# Patient Record
Sex: MALE
[De-identification: names, ages, dates, MRNs, and addresses within clinical notes are randomized per-mention and may not be internally consistent; named-entity substitution may affect disease eponyms.]

---

## 2019-06-20 ENCOUNTER — RESULT REVIEW (OUTPATIENT)
Age: 54
End: 2019-06-20

## 2020-11-28 ENCOUNTER — TRANSCRIPTION ENCOUNTER (OUTPATIENT)
Age: 55
End: 2020-11-28

## 2022-07-05 PROBLEM — Z00.00 ENCOUNTER FOR PREVENTIVE HEALTH EXAMINATION: Status: ACTIVE | Noted: 2022-07-05

## 2022-08-08 ENCOUNTER — APPOINTMENT (OUTPATIENT)
Dept: ORTHOPEDIC SURGERY | Facility: CLINIC | Age: 57
End: 2022-08-08

## 2022-08-08 PROCEDURE — 99213 OFFICE O/P EST LOW 20 MIN: CPT

## 2022-08-08 RX ORDER — FLUTICASONE FUROATE AND VILANTEROL TRIFENATATE 100; 25 UG/1; UG/1
100-25 POWDER RESPIRATORY (INHALATION)
Qty: 60 | Refills: 0 | Status: DISCONTINUED | COMMUNITY
Start: 2022-04-27

## 2022-08-08 RX ORDER — NABUMETONE 750 MG/1
750 TABLET, FILM COATED ORAL
Qty: 60 | Refills: 0 | Status: DISCONTINUED | COMMUNITY
Start: 2022-02-15

## 2022-08-08 RX ORDER — PREDNISONE 20 MG/1
20 TABLET ORAL
Qty: 8 | Refills: 0 | Status: DISCONTINUED | COMMUNITY
Start: 2022-05-02

## 2022-08-08 RX ORDER — DOXYCYCLINE HYCLATE 100 MG/1
100 CAPSULE ORAL
Qty: 14 | Refills: 0 | Status: DISCONTINUED | COMMUNITY
Start: 2022-04-27

## 2022-08-08 RX ORDER — OXYCODONE AND ACETAMINOPHEN 5; 325 MG/1; MG/1
5-325 TABLET ORAL
Qty: 20 | Refills: 0 | Status: DISCONTINUED | COMMUNITY
Start: 2022-05-04

## 2022-08-08 NOTE — HISTORY OF PRESENT ILLNESS
[de-identified] : The patient is a 57-year-old male here for subsequent re-evaluation of his left shoulder. He is\par status post left shoulder arthroscopy for rotator cuff repair, medial and lateral row fixation, subacromial decompression,\par extensive debridement of the glenohumeral joint of three separate structures and open biceps tenodesis on 05/05/2022.\par He is almost a little over 3 months out from his surgery. He is still having some pain in the shoulder. He is doing therapy at [facility] [address].

## 2022-08-08 NOTE — PHYSICAL EXAM
[Left] : left shoulder [] : motor and sensory intact distally [de-identified] :   He has weakness with rotator cuff resistance testing. [TWNoteComboBox7] : active forward flexion 165 degrees [TWNoteComboBox4] : passive forward flexion 180 degrees [de-identified] : active abduction 95 degrees [TWNoteComboBox6] : internal rotation L4

## 2022-08-08 NOTE — DISCUSSION/SUMMARY
[de-identified] :   Today I recommend he continue formal therapy, Rx provided for that.  I will see him back in 2 months for further evaluation.\par \par Supervising physician:

## 2022-09-06 ENCOUNTER — APPOINTMENT (OUTPATIENT)
Dept: NEUROLOGY | Facility: CLINIC | Age: 57
End: 2022-09-06

## 2022-09-06 VITALS
HEIGHT: 66 IN | WEIGHT: 187 LBS | BODY MASS INDEX: 30.05 KG/M2 | DIASTOLIC BLOOD PRESSURE: 83 MMHG | HEART RATE: 89 BPM | SYSTOLIC BLOOD PRESSURE: 110 MMHG

## 2022-09-06 PROCEDURE — 99213 OFFICE O/P EST LOW 20 MIN: CPT

## 2022-09-06 NOTE — REVIEW OF SYSTEMS
[Leg Weakness] : leg weakness [Numbness] : numbness [Tingling] : tingling [Abnormal Sensation] : an abnormal sensation [Negative] : Respiratory

## 2022-09-06 NOTE — HISTORY OF PRESENT ILLNESS
[FreeTextEntry1] : TODAY:  I had the pleasure of seeing Mr. Smalls today in follow up.  His previous history and physical findings have been reviewed.\par \par He is under our care for  RLS as well as headaches which he is receiving continuing active treatment for.  He states his headaches are no longer an issue as he is managed on a regimen of Emgality 120 mg/ml monthly which prevents his headaches.  When they do occur he will use sumatriptan which alleviates them completely.  He experiences no adverse side effects and we will continue as is. With respect to his RLS we had tried changing him to Horizant to address his symptoms but was not covered by his insurance.  He did not want to trial gabapentin and was subsequently switched back to mirapex 4mg po qhs which helps.  He states some nights he feels he can use an extra pill and I advised that typically the maximum dosage is 4.5 mg and we will discuss trialing that at this time.

## 2022-09-06 NOTE — ASSESSMENT
[FreeTextEntry1] : 57 year old male with headaches and RLS.  We will continue emgality and sumitriptan as mentioned and increase his mirapex to 4.5 mg po qhs.  He will f/u in 6 months for re evaluation and if there is any issue he will contact the office.\par \par I personally reviewed with the PA, this patient's history and physical exam findings, as documented above. I have discussed the relevant areas of concern, having direct implications to the presenting problems and illnesses, and I have personally examined all pertinent and positive and negative findings, which impact on the prior neurological treatment. \par \par \par Emily Sol, MS, PA-C\par Tod Jordan DO\par

## 2022-10-12 ENCOUNTER — APPOINTMENT (OUTPATIENT)
Dept: ORTHOPEDIC SURGERY | Facility: CLINIC | Age: 57
End: 2022-10-12

## 2022-10-19 ENCOUNTER — APPOINTMENT (OUTPATIENT)
Dept: PAIN MANAGEMENT | Facility: CLINIC | Age: 57
End: 2022-10-19

## 2022-10-19 VITALS
WEIGHT: 185 LBS | SYSTOLIC BLOOD PRESSURE: 133 MMHG | BODY MASS INDEX: 29.73 KG/M2 | DIASTOLIC BLOOD PRESSURE: 90 MMHG | HEART RATE: 80 BPM | HEIGHT: 66 IN

## 2022-10-19 DIAGNOSIS — M54.2 CERVICALGIA: ICD-10-CM

## 2022-10-19 DIAGNOSIS — Z98.1 ARTHRODESIS STATUS: ICD-10-CM

## 2022-10-19 PROCEDURE — 99214 OFFICE O/P EST MOD 30 MIN: CPT

## 2022-10-19 NOTE — DISCUSSION/SUMMARY
[de-identified] : 57-year-old male with chronic neck pain s/p fusion in the past. He has pain traveling down his right arm into his elbow. He has trialed various conservative treatments in the past with no relief. He agrees to trial acupuncture if his insurance covers it. He is not interested in a SCS trial. He defers a UDS and therefore I will not be prescribing him any pain medications at this time. He will follow up at our office as needed. \par \par Madelyn Burger PA-C\par Boubacar Saez MD\par

## 2022-10-19 NOTE — DATA REVIEWED
[FreeTextEntry1] : Imaging Study Review: MRI of the cervical spine 10/22/2021: Multilevel degenerative change, particularly C3-C4, C7-T1 levels. Prior anterior C4-C7 fusion. Grade 1 C7-T1 spondylolisthesis. Recommend passive flexion and extension cervical radiographs. Left greater than right C7-T1 facet hypertrophy. Left greater than right C7-T1 neural foraminal stenosis.\par \par X-ray of the cervical spine 11/03/2021: C4-5 and C5-6 interbody fusion with allografts in place and anterior plate with vertebral body screws at C4, C5 and C6. C6-C7 bony fusion. Minimal change of the upper cervical spine between neutral, flexion and extension views with no evidence of instability.

## 2022-10-19 NOTE — PHYSICAL EXAM
[Normal Coordination] : normal coordination [Normal Sensation] : normal sensation [Able to Communicate] : able to communicate [Well Developed] : well developed [Well Nourished] : well nourished [] : diminished ROM in all planes [Flexion] : flexion [Extension] : extension [Rotation to left] : rotation to left [Rotation to right] : rotation to right

## 2022-10-19 NOTE — HISTORY OF PRESENT ILLNESS
[FreeTextEntry1] : HPI: ORIGINAL PRESENTATION: He is a 56-year-old gentleman who presents today for evaluation of neck pain with associated spasms and radiculopathy into the right arm along with persistent lower back pain. He does have a past medical history of a C5-C7 fusion, which was followed by removal of the C5-C7 plate and ACDF at C4-C5 with plating from C4-C6. His surgeries were performed by Dr. Meier. Unfortunately, despite surgical treatment, he has had persistent pain nevertheless.\par \par TODAY: He presents for a revisit encounter. Last seen in 2021. He reports he underwent shoulder surgery, which made his neck pain worse. He is having difficulty with ROM. He has pain traveling down to his elbow. He underwent 2 GEOVANI's in December 2021 with no relief. He continues with nabumetone and a muscle relaxer. He has trialed PT in the past with no relief. He is not interested in trialing chiropractic treatments or a SCS trial. We discussed the option of acupuncture, which he agrees to look into and see if his insurance will cover it. I also discussed a pain medication for as needed purposes; however, the patient does not want to do a urine screen due to the UDS being to costly for him.

## 2022-10-20 ENCOUNTER — NON-APPOINTMENT (OUTPATIENT)
Age: 57
End: 2022-10-20

## 2022-11-09 ENCOUNTER — RX RENEWAL (OUTPATIENT)
Age: 57
End: 2022-11-09

## 2022-11-10 RX ORDER — PRAMIPEXOLE DIHYDROCHLORIDE 1 MG/1
1 TABLET ORAL
Qty: 180 | Refills: 0 | Status: ACTIVE | COMMUNITY
Start: 2022-07-07 | End: 1900-01-01

## 2023-01-05 ENCOUNTER — APPOINTMENT (OUTPATIENT)
Dept: NEUROLOGY | Facility: CLINIC | Age: 58
End: 2023-01-05
Payer: COMMERCIAL

## 2023-01-05 VITALS
SYSTOLIC BLOOD PRESSURE: 117 MMHG | WEIGHT: 185 LBS | BODY MASS INDEX: 29.73 KG/M2 | HEART RATE: 90 BPM | HEIGHT: 66 IN | DIASTOLIC BLOOD PRESSURE: 86 MMHG

## 2023-01-05 PROCEDURE — 99214 OFFICE O/P EST MOD 30 MIN: CPT

## 2023-01-05 RX ORDER — PREDNISONE 20 MG/1
20 TABLET ORAL DAILY
Qty: 8 | Refills: 0 | Status: ACTIVE | COMMUNITY
Start: 2023-01-05 | End: 1900-01-01

## 2023-01-05 NOTE — REVIEW OF SYSTEMS
[Leg Weakness] : leg weakness [Numbness] : numbness [Tingling] : tingling Home [Abnormal Sensation] : an abnormal sensation [Negative] : Respiratory

## 2023-01-05 NOTE — ASSESSMENT
[FreeTextEntry1] :   Cluster headache- will give him 4 days of prednisone to break the cycle, and use sumatriptan as abortive therapy.  Since he was able to be headache free for 2 years, will hoping that after the steroid use, he would not need a preventive agent.\par \par   Restless leg syndrome-continue with current regiment\par  put\par Melissa LAROSE attest that this document has been prepared under the direction and in the presence of Provider Adal Jordan DO\vance \par Thank You for letting me assist in the management of this patient.\par \par Tod Jordan DO \vance Board Certified, Neurology \par

## 2023-01-05 NOTE — HISTORY OF PRESENT ILLNESS
[FreeTextEntry1] : Mr. MONCHO REYNOLDS returns to the office for follow-up and his prior history and physical have been reviewed and he reports no change since last visit.  he has been seeing us for  restless leg syndrome  as well as cluster headaches.    his restless leg syndrome has been under control since his pramipexole  was increased to 4.5 mg.  His main reason for today's visit was that after 2 years of cluster headache free, it came back recently.  He used to use Emgality  which he did not think that the work that well, but now the insurance   started to charge him over 100 dollars for each pen, he could not afford to continue.  In the past sumatriptan injection head always worked well as abortive therapy.\par

## 2023-01-05 NOTE — PHYSICAL EXAM
[General Appearance - Alert] : alert [General Appearance - In No Acute Distress] : in no acute distress [Oriented To Time, Place, And Person] : oriented to person, place, and time [Affect] : the affect was normal [Mood] : the mood was normal [Memory Recent] : recent memory was not impaired [Memory Remote] : remote memory was not impaired [Person] : oriented to person [Place] : oriented to place [Time] : oriented to time [Short Term Intact] : short term memory intact [Remote Intact] : remote memory intact [Registration Intact] : recent registration memory intact [Cranial Nerves Optic (II)] : visual acuity intact bilaterally,  visual fields full to confrontation, pupils equal round and reactive to light [Cranial Nerves Oculomotor (III)] : extraocular motion intact [Cranial Nerves Facial (VII)] : face symmetrical [Cranial Nerves Accessory (XI - Cranial And Spinal)] : head turning and shoulder shrug symmetric [Motor Strength] : muscle strength was normal in all four extremities [Motor Tone] : muscle tone was normal in all four extremities [Involuntary Movements] : no involuntary movements were seen [Concentration Intact] : normal concentrating ability [Visual Intact] : visual attention was ~T not ~L decreased [Naming Objects] : no difficulty naming common objects [Repeating Phrases] : no difficulty repeating a phrase [Writing A Sentence] : no difficulty writing a sentence [Fluency] : fluency intact [Comprehension] : comprehension intact [Reading] : reading intact [Past History] : adequate knowledge of personal past history [Cranial Nerves Trigeminal (V)] : facial sensation intact symmetrically [Cranial Nerves Vestibulocochlear (VIII)] : hearing was intact bilaterally [Cranial Nerves Glossopharyngeal (IX)] : tongue and palate midline [Cranial Nerves Hypoglossal (XII)] : there was no tongue deviation with protrusion [No Muscle Atrophy] : normal bulk in all four extremities [Sensation Tactile Decrease] : light touch was intact [Abnormal Walk] : normal gait [Balance] : balance was intact [Past-pointing] : there was no past-pointing [Tremor] : no tremor present [2+] : Ankle jerk left 2+ [Plantar Reflex Right Only] : normal on the right [Plantar Reflex Left Only] : normal on the left

## 2023-01-09 ENCOUNTER — APPOINTMENT (OUTPATIENT)
Dept: ORTHOPEDIC SURGERY | Facility: CLINIC | Age: 58
End: 2023-01-09
Payer: COMMERCIAL

## 2023-01-09 DIAGNOSIS — M75.22 BICIPITAL TENDINITIS, LEFT SHOULDER: ICD-10-CM

## 2023-01-09 PROCEDURE — 99213 OFFICE O/P EST LOW 20 MIN: CPT

## 2023-01-09 NOTE — PHYSICAL EXAM
[Left] : left shoulder [] : no erythema [FreeTextEntry8] :   Tenderness to palpation over the mid biceps [de-identified] :   He has weakness with rotator cuff resistance testing. [TWNoteComboBox7] : active forward flexion 100 degrees [TWNoteComboBox4] : passive forward flexion 180 degrees [de-identified] : active abduction 95 degrees [TWNoteComboBox6] : internal rotation L4

## 2023-01-09 NOTE — DISCUSSION/SUMMARY
[de-identified] :  At this point he has had formal physical therapy for this, as well as anti-inflammatory medication, and has had more than 6 weeks of physician directed treatment with this biceps pain, send authorization for MRI left humerus to evaluate the source of his biceps pain.  He will call me 2 days after the MRI is performed so we can discuss results, I will see him in 2 months for further evaluation.\par \par Supervising physician:  Dr. Lyon

## 2023-01-09 NOTE — HISTORY OF PRESENT ILLNESS
[de-identified] : The patient is a 57-year-old male here for a subsequent re-evaluation of his left shoulder. He is\par status post left shoulder arthroscopy for rotator cuff repair, medial and lateral row fixation, subacromial decompression,\par extensive debridement of the glenohumeral joint of three separate structures and open biceps tenodesis on 05/05/2022.\par He is almost a little over 8 months out from his surgery. He is still having some pain in his mid biceps.  He has been doing formal physical therapy at [facility], his last therapy session was 1 month ago..  He has tried nabumetone for the biceps.

## 2023-02-28 ENCOUNTER — NON-APPOINTMENT (OUTPATIENT)
Age: 58
End: 2023-02-28

## 2023-03-06 ENCOUNTER — APPOINTMENT (OUTPATIENT)
Dept: ORTHOPEDIC SURGERY | Facility: CLINIC | Age: 58
End: 2023-03-06

## 2023-03-14 ENCOUNTER — APPOINTMENT (OUTPATIENT)
Dept: ORTHOPEDIC SURGERY | Facility: CLINIC | Age: 58
End: 2023-03-14
Payer: COMMERCIAL

## 2023-03-14 DIAGNOSIS — M75.122 COMPLETE ROTATOR CUFF TEAR OR RUPTURE OF LEFT SHOULDER, NOT SPECIFIED AS TRAUMATIC: ICD-10-CM

## 2023-03-14 DIAGNOSIS — M75.02 ADHESIVE CAPSULITIS OF LEFT SHOULDER: ICD-10-CM

## 2023-03-14 PROCEDURE — 20611 DRAIN/INJ JOINT/BURSA W/US: CPT | Mod: LT

## 2023-03-14 PROCEDURE — 99213 OFFICE O/P EST LOW 20 MIN: CPT | Mod: 25

## 2023-03-14 RX ORDER — SUMATRIPTAN SUCCINATE 6 MG/.5ML
6 INJECTION SUBCUTANEOUS
Qty: 15 | Refills: 2 | Status: ACTIVE | COMMUNITY
Start: 2023-01-05 | End: 1900-01-01

## 2023-03-14 NOTE — PHYSICAL EXAM
[Left] : left shoulder [] : motor and sensory intact distally [FreeTextEntry8] : Tenderness to palpation over the mid biceps [TWNoteComboBox7] : active forward flexion 140 degrees [TWNoteComboBox4] : passive forward flexion 140 degrees [de-identified] : active abduction 90 degrees [TWNoteComboBox6] : internal rotation L5

## 2023-03-14 NOTE — DISCUSSION/SUMMARY
[de-identified] : In my opinion he has developed a frozen shoulder.  I recommend a cortisone injection for the left shoulder.  The patient agreed.  The left glenohumeral joint was injected with 2 cc lidocaine, 1 see dexamethasone.  Procedure note generated.  He will return to formal physical therapy for his left shoulder adhesive capsulitis.  I will see him in 2 months for further evaluation\par \par Supervising Physician: Dr. Lyon

## 2023-03-14 NOTE — HISTORY OF PRESENT ILLNESS
[de-identified] : The patient is a 57-year-old male here for a subsequent re-evaluation of his left shoulder. He is status post left shoulder arthroscopy for rotator cuff repair, medial and lateral row fixation, subacromial decompression, extensive debridement of the glenohumeral joint of three separate structures and open biceps tenodesis on 05/05/2022.  He is still having pain in the left shoulder.  His latest MRI of the left shoulder on 3/8/2023 did not show a retear of the rotator cuff.  Dr. Lyon and I reviewed these images.  He reports decreased range of motion of the shoulder.

## 2023-03-14 NOTE — PROCEDURE
[Large Joint Injection] : Large joint injection [Left] : of the left [Glenohumeral Joint] : glenohumeral joint [___ cc    1%] : Lidocaine ~Vcc of 1%  [___ cc    4mg] : Dexamethasone (Decadron) ~Vcc of 4 mg  [Risks, benefits, alternatives discussed / Verbal consent obtained] : the risks benefits, and alternatives have been discussed, and verbal consent was obtained [Glenohmeral injection] : glenohumeral injection [All ultrasound images have been permanently captured and stored accordingly in our picture archiving and communication system] : All ultrasound images have been permanently captured and stored accordingly in our picture archiving and communication system

## 2023-05-08 ENCOUNTER — RX RENEWAL (OUTPATIENT)
Age: 58
End: 2023-05-08

## 2023-07-28 ENCOUNTER — APPOINTMENT (OUTPATIENT)
Dept: ORTHOPEDIC SURGERY | Facility: CLINIC | Age: 58
End: 2023-07-28
Payer: COMMERCIAL

## 2023-07-28 VITALS — BODY MASS INDEX: 30.37 KG/M2 | HEIGHT: 66 IN | WEIGHT: 189 LBS

## 2023-07-28 DIAGNOSIS — S90.31XA CONTUSION OF RIGHT FOOT, INITIAL ENCOUNTER: ICD-10-CM

## 2023-07-28 DIAGNOSIS — S93.691A OTHER SPRAIN OF RIGHT FOOT, INITIAL ENCOUNTER: ICD-10-CM

## 2023-07-28 DIAGNOSIS — S90.121A CONTUSION OF RIGHT FOOT, INITIAL ENCOUNTER: ICD-10-CM

## 2023-07-28 PROCEDURE — 73630 X-RAY EXAM OF FOOT: CPT | Mod: RT

## 2023-07-28 PROCEDURE — 99213 OFFICE O/P EST LOW 20 MIN: CPT

## 2023-07-28 NOTE — PHYSICAL EXAM
[Right] : right foot and ankle [Mild] : mild swelling of toe(s) [1st] : 1st [2nd] : 2nd [3rd] : 3rd [4th] : 4th [4___] : [facility] 4[unfilled]/5 [2+] : posterior tibialis pulse: 2+ [] : antalgic [FreeTextEntry9] : Limited ROM secondary to swelling/pain

## 2023-07-28 NOTE — HISTORY OF PRESENT ILLNESS
[de-identified] : Patient is a 58-year-old male who reports to the office for evaluation of his right foot/toe pain since yesterday on 7/27/2023.  He attempted to kick a ball when he accidentally kicked a post in his yard instead.  Since the injury, admits to bruising and swelling on the dorsal aspect of his foot.  Walking, range of motion, palpating certain areas of the foot and toes aggravate the patient's pain.  Denies any numbness or tingling.

## 2023-07-28 NOTE — DISCUSSION/SUMMARY
[de-identified] : Patient's first and second and third and fourth toes were buddy taped together for support/stability.  He was placed in a short cam walker boot and may weight-bear as tolerated using a cane if needed.\par \par The patient was advised to rest/ice the area and may alternate with warm compresses as needed.  Gentle ROM exercises and Epson salt and warm water was encouraged.  Explained to the patient that this may take 4 to 6 weeks to fully heal and that the first 2 weeks are usually the worst.\par \par He will take nabumetone which she has at home as needed for pain.  Right foot MRI ordered for further evaluation.  Patient was advised to call the office a few days after getting the MRI done to discuss results over the phone.\par \par The patient will follow-up in 2-3 weeks for repeat x-rays and further evaluation.  All of the patient's questions/concerns were answered in detail.\par \par \par

## 2023-08-08 ENCOUNTER — APPOINTMENT (OUTPATIENT)
Dept: MRI IMAGING | Facility: CLINIC | Age: 58
End: 2023-08-08

## 2023-08-16 ENCOUNTER — APPOINTMENT (OUTPATIENT)
Dept: ORTHOPEDIC SURGERY | Facility: CLINIC | Age: 58
End: 2023-08-16
Payer: COMMERCIAL

## 2023-08-16 DIAGNOSIS — S92.911A UNSPECIFIED FRACTURE OF RIGHT TOE(S), INITIAL ENCOUNTER FOR CLOSED FRACTURE: ICD-10-CM

## 2023-08-16 PROCEDURE — 99203 OFFICE O/P NEW LOW 30 MIN: CPT

## 2023-08-16 PROCEDURE — 99213 OFFICE O/P EST LOW 20 MIN: CPT

## 2023-08-16 PROCEDURE — 73660 X-RAY EXAM OF TOE(S): CPT | Mod: RT

## 2023-08-16 NOTE — PHYSICAL EXAM
[de-identified] : He is swelling and tenderness palpation at the MTP joints of the second and third toes.  There is intact active and passive range of motion.  No significant instability noted.  He is neurovascular intact distally.

## 2023-08-16 NOTE — HISTORY OF PRESENT ILLNESS
[de-identified] : 58-year-old patient comes to see me in regards to an injury to his right foot which she sustained approximately 2 weeks ago.  He was seen at the urgent care where he was diagnosed with lesser toe fractures present at the proximal phalanx.  Currently his pain is well controlled.  Is been taping.  Minimal pain.

## 2023-08-16 NOTE — DISCUSSION/SUMMARY
[de-identified] : I reassured the patient that this will go on to heal uneventfully.  He should continue wearing a hard soled shoe.  Avoid impact activity for another month.  I will see him back as needed.  All questions answered.

## 2023-08-16 NOTE — DATA REVIEWED
[FreeTextEntry1] : 3 views of the patient's right foot ordered reviewed by me personally.  They demonstrate evidence of displaced avulsion fractures which are intra-articular involving the second and third proximal phalanges.

## 2023-10-03 ENCOUNTER — APPOINTMENT (OUTPATIENT)
Dept: PAIN MANAGEMENT | Facility: CLINIC | Age: 58
End: 2023-10-03

## 2023-10-27 ENCOUNTER — APPOINTMENT (OUTPATIENT)
Dept: PAIN MANAGEMENT | Facility: CLINIC | Age: 58
End: 2023-10-27
Payer: COMMERCIAL

## 2023-10-27 VITALS — BODY MASS INDEX: 28.93 KG/M2 | HEIGHT: 66 IN | WEIGHT: 180 LBS

## 2023-10-27 VITALS — WEIGHT: 180 LBS | HEIGHT: 66 IN | BODY MASS INDEX: 28.93 KG/M2

## 2023-10-27 DIAGNOSIS — M25.561 PAIN IN RIGHT KNEE: ICD-10-CM

## 2023-10-27 DIAGNOSIS — M25.562 PAIN IN RIGHT KNEE: ICD-10-CM

## 2023-10-27 DIAGNOSIS — Z87.39 PERSONAL HISTORY OF OTHER DISEASES OF THE MUSCULOSKELETAL SYSTEM AND CONNECTIVE TISSUE: ICD-10-CM

## 2023-10-27 DIAGNOSIS — J43.9 EMPHYSEMA, UNSPECIFIED: ICD-10-CM

## 2023-10-27 DIAGNOSIS — Z82.61 FAMILY HISTORY OF ARTHRITIS: ICD-10-CM

## 2023-10-27 DIAGNOSIS — G89.29 PAIN IN RIGHT KNEE: ICD-10-CM

## 2023-10-27 PROCEDURE — 99204 OFFICE O/P NEW MOD 45 MIN: CPT

## 2023-10-27 PROCEDURE — 99214 OFFICE O/P EST MOD 30 MIN: CPT

## 2023-10-27 RX ORDER — DULOXETINE HYDROCHLORIDE 60 MG/1
60 CAPSULE, DELAYED RELEASE PELLETS ORAL
Qty: 30 | Refills: 1 | Status: ACTIVE | COMMUNITY
Start: 2023-10-27 | End: 1900-01-01

## 2023-10-27 RX ORDER — NABUMETONE 750 MG/1
750 TABLET, FILM COATED ORAL
Qty: 30 | Refills: 0 | Status: ACTIVE | COMMUNITY
Start: 2023-10-27 | End: 1900-01-01

## 2023-12-04 ENCOUNTER — APPOINTMENT (OUTPATIENT)
Dept: PAIN MANAGEMENT | Facility: CLINIC | Age: 58
End: 2023-12-04
Payer: COMMERCIAL

## 2023-12-04 VITALS — WEIGHT: 180 LBS | HEIGHT: 66 IN | BODY MASS INDEX: 28.93 KG/M2

## 2023-12-04 DIAGNOSIS — M47.812 SPONDYLOSIS W/OUT MYELOPATHY OR RADICULOPATHY, CERVICAL REGION: ICD-10-CM

## 2023-12-04 DIAGNOSIS — M54.16 RADICULOPATHY, LUMBAR REGION: ICD-10-CM

## 2023-12-04 PROCEDURE — 99214 OFFICE O/P EST MOD 30 MIN: CPT

## 2023-12-04 RX ORDER — MELOXICAM 15 MG/1
15 TABLET ORAL DAILY
Qty: 30 | Refills: 0 | Status: ACTIVE | COMMUNITY
Start: 2023-12-04 | End: 1900-01-01

## 2023-12-04 RX ORDER — METHYLPREDNISOLONE 4 MG/1
4 TABLET ORAL
Qty: 1 | Refills: 0 | Status: ACTIVE | COMMUNITY
Start: 2023-12-04 | End: 1900-01-01

## 2024-01-02 ENCOUNTER — APPOINTMENT (OUTPATIENT)
Dept: PAIN MANAGEMENT | Facility: CLINIC | Age: 59
End: 2024-01-02
Payer: COMMERCIAL

## 2024-01-02 DIAGNOSIS — M79.18 MYALGIA, OTHER SITE: ICD-10-CM

## 2024-01-02 PROCEDURE — 99214 OFFICE O/P EST MOD 30 MIN: CPT | Mod: 25

## 2024-01-02 PROCEDURE — 20552 NJX 1/MLT TRIGGER POINT 1/2: CPT | Mod: RT

## 2024-01-02 NOTE — PHYSICAL EXAM
[de-identified] : Cervical Spine:  + Spurling's on the right side  Lumbar spine:  +facet loading bilaterally  TTP to right cervical paraspinals and traps

## 2024-01-02 NOTE — DATA REVIEWED
[FreeTextEntry1] : MRI of the cervical spine dated 12/05/23 suggests,    Post C4-7 ACDF. Degenerative change of the cervical spine as detailed in the body of the report, not significantly changed from prior MRI dated 10/22/21.    Mild anterolisthesis at C7-T1 with mild intraosseous and pericapsular edema associated with the bilateral C7-T1 facet joints, similar to prior.

## 2024-01-02 NOTE — PROCEDURE
[Trigger point 1-2 muscle groups] : trigger point 1-2 muscle groups [Right] : of the right [Cervical paraspinal muscle] : cervical paraspinal muscle [Trapezius muscle] : trapezius muscle [Pain] : pain [Alcohol] : alcohol [___ cc    0.25%] : Bupivacaine (Marcaine) ~Vcc of 0.25%  [___ cc    4mg] : Dexamethasone (Decadron) ~Vcc of 4 mg  [Call if redness, pain or fever occur] : call if redness, pain or fever occur [Apply ice for 15min out of every hour for the next 12-24 hours as tolerated] : apply ice for 15 minutes out of every hour for the next 12-24 hours as tolerated [Previous OTC use and PT nontherapeutic] : patient has tried OTC's including aspirin, Ibuprofen, Aleve, etc or prescription NSAIDS, and/or exercises at home and/or physical therapy without satisfactory response [Patient had decreased mobility in the joint] : patient had decreased mobility in the joint [Risks, benefits, alternatives discussed / Verbal consent obtained] : the risks benefits, and alternatives have been discussed, and verbal consent was obtained

## 2024-01-02 NOTE — HISTORY OF PRESENT ILLNESS
[FreeTextEntry1] : 12/4/23 Patient is present for a imaging review for the neck. At his last visit we ordered an x-ray and MRI of the lumbar spine but patient did not have it done yet. Regarding his neck pain, he described his pain as a stabbing, pulling sensation located on the right side of the neck with tingling down to the right elbow. Cervical extension and lateral rotation improve the pain, cervical flexion worsens the pain. Pt has tried NSAIDs, 6 weeks of PT, HEP in the past with minimal relief. Patient has tried injection therapy in the past with Dr. Saez with minimal relief. Today he rates his pain a 8/10 on the pain scale.   Regarding low back pain, Thomas continues to be throbbing, dull/achy in nature. Pain is worse with standing and walking better with sitting. Patient had tried NSAIDs, PT, and Chiropractic therapy in the past with minimal to no relief. Patient rates his pain a 7/10 on the pain scale. Patient denies bowel/bladder incontinence, weakness, falls.  TODAY REVISIT ENCOUNTER: 01/02/24.  Patient presents for a follow up visit today with continued neck pain, he describes the pain as sharp, stabbing and a pulling sensation on the right side of the neck associated with spasms and tingling down to his right elbow. He rates the pain a 9  out of 10 on the pain scale today. He notes he has trialed injection therapy in the past with Dr. Saez with minimal relief, however he notes he has yet to trial cervical trigger points. Patient denies any bowel or bladder dysfunction, incontinence, or saddle anesthesia.

## 2024-01-02 NOTE — DISCUSSION/SUMMARY
[de-identified] : A discussion regarding available pain management treatment options occurred with the patient. These included interventional, rehabilitative, pharmacological, and alternative modalities. We will proceed with the following:   Interventional treatment options:  -In office today - Cervical paraspinals and traps right sided trigger points.  Patient has documented myofascial spasms and trigger points in the muscle. Patient has trialed rehab (Home exercise, physical therapy or chiropractic care) and medications. We will schedule a series of 1-3 trigger points over 3-6 weeks. If 50% or greater relief for 6-8 weeks another can be repeated vs Botox.  Risks with the procedure such as bleeding and infection was discussed in detail.  -Ordered a C7-T1 GEOVANI without MAC.  Treatment options were discussed with the patient. The patient has been having persistent neck pain with radiculopathy with minimal improvement with conservative therapies. The patient was given the option to proceed with a cervical epidural steroid injection to try to get some pain relief.  If we are unable to get pain relief with this procedure we will reassess our options before proceeding.    The risks and benefits were discussed which included bleeding, infection, nerve injury, no pain relief or worse, increased pain. All questions were answered, and the patient will schedule for the injection on the next available date.    Medication based treatment options:  -4mg Medrol dose pack was prescribed for 6 days. If pain continues to persist in 1 week begin ordered Mobic 15mg daily for 2 weeks. Discussed risks and benefits. Avoid taking for any side effects  -Ordered Methocarbamol 750 MG TAKE 1 TABLET AT BEDTIME for 30 Days    Follow up 1-2 weeks post injection for assessment of efficacy and further recommendations.  I, Robert Hansen, attest that this documentation has been prepared under the direction and in the presence of Provider Tomi Campuzano, DO The documentation recorded by the scribe, in my presence, accurately reflects the service I personally performed, and the decisions made by me with my edits as appropriate.   Best Regards, Tomi Campuzano D.O.

## 2024-01-17 ENCOUNTER — APPOINTMENT (OUTPATIENT)
Dept: PAIN MANAGEMENT | Facility: CLINIC | Age: 59
End: 2024-01-17

## 2024-02-07 ENCOUNTER — APPOINTMENT (OUTPATIENT)
Dept: PAIN MANAGEMENT | Facility: CLINIC | Age: 59
End: 2024-02-07

## 2024-04-03 ENCOUNTER — APPOINTMENT (OUTPATIENT)
Dept: PAIN MANAGEMENT | Facility: CLINIC | Age: 59
End: 2024-04-03
Payer: COMMERCIAL

## 2024-04-03 PROCEDURE — 62321 NJX INTERLAMINAR CRV/THRC: CPT

## 2024-04-03 NOTE — PROCEDURE
[FreeTextEntry3] : Date of Service: 04/03/2024   Account: [id number]  Patient: MONCHO REYNOLDS   YOB: 1965  Age: 59 year  Surgeon:      Tomi Campuzano DO  Assistant:    None  Pre-Operative Diagnosis:         Cervical Radiculopathy (M54.12)  Post Operative Diagnosis:       Cervical Radiculopathy (M54.12)  Procedure:             Cervical (C7-T1) interlaminar epidural steroid injection under fluoroscopic guidance  Anesthesia:  none  This procedure was carried out using fluoroscopic guidance.  The risks and benefits of the procedure were discussed extensively with the patient.  The consent of the patient was obtained and the following procedure was performed. The patient was placed in the prone position on the fluoroscopy table and the area was prepped and draped in a sterile fashion.  A timeout was performed with all essential staff present and the site and side were verified.  The patient was placed in the prone position and optimized to patient comfort.  The cervical area was prepped and draped in a sterile fashion.  The fluoroscope visualized the C7-T1 interspace using slight cephalad-caudad angulation and this area was marked.  Using sterile technique the superficial skin was anesthetized with 1% Lidocaine.  A 20 gauge 3.5 inch Tuohy needle was advanced under fluoroscopy until ligament was engaged.  Using a contralateral oblique view, a "loss of resistance" to air technique was utilized in order to gain access to the epidural space.  After negative aspiration for heme and CSF, 1 cc of Omnipaque contrast was administered and the appropriate cervical epidurogram was obtained in the CORIN and A/P view..  A total injectate of 3 cc of preservative free normal saline and 10mg of Dexamethasone was then injected into the epidural space while maintaining meaningful verbal contact with the patient.    The needle was subsequently removed.  Vital signs remained normal.  Pulse oximeter was used throughout the procedure and the patient's pulse and oxygen saturation remained within normal limits.  The patient tolerated the procedure well.  There were no complications.  The patient was instructed to apply ice over the injection sites for twenty minutes every two hours for the next 24 to 48 hours.  Disposition:       1. The patient was advised to F/U in 1-2 weeks to assess the response to the injection.      2. The patient was also instructed to contact me immediately if there were any concerns related to the procedure performed.    Tomi Campuzano DO

## 2024-04-29 ENCOUNTER — APPOINTMENT (OUTPATIENT)
Dept: PAIN MANAGEMENT | Facility: CLINIC | Age: 59
End: 2024-04-29
Payer: COMMERCIAL

## 2024-04-29 VITALS — WEIGHT: 180 LBS | HEIGHT: 66 IN | BODY MASS INDEX: 28.93 KG/M2

## 2024-04-29 DIAGNOSIS — M54.12 RADICULOPATHY, CERVICAL REGION: ICD-10-CM

## 2024-04-29 DIAGNOSIS — M54.50 LOW BACK PAIN, UNSPECIFIED: ICD-10-CM

## 2024-04-29 PROCEDURE — 99214 OFFICE O/P EST MOD 30 MIN: CPT

## 2024-04-29 RX ORDER — METHOCARBAMOL 750 MG/1
750 TABLET, FILM COATED ORAL
Qty: 30 | Refills: 2 | Status: ACTIVE | COMMUNITY
Start: 2023-12-04 | End: 1900-01-01

## 2024-04-29 NOTE — HISTORY OF PRESENT ILLNESS
[FreeTextEntry1] : 12/4/23 Patient is present for a imaging review for the neck. At his last visit we ordered an x-ray and MRI of the lumbar spine but patient did not have it done yet. Regarding his neck pain, he described his pain as a stabbing, pulling sensation located on the right side of the neck with tingling down to the right elbow. Cervical extension and lateral rotation improve the pain, cervical flexion worsens the pain. Pt has tried NSAIDs, 6 weeks of PT, HEP in the past with minimal relief. Patient has tried injection therapy in the past with Dr. Saez with minimal relief. Today he rates his pain a 8/10 on the pain scale.   Regarding low back pain, Thomas continues to be throbbing, dull/achy in nature. Pain is worse with standing and walking better with sitting. Patient had tried NSAIDs, PT, and Chiropractic therapy in the past with minimal to no relief. Patient rates his pain a 7/10 on the pain scale. Patient denies bowel/bladder incontinence, weakness, falls.  TODAY 04-: Patient presents to the office today for a follow up visit, he is status post a cervical (C7-T1) interlaminar epidural steroid injection under fluoroscopic guidance on 04/03/2024 which provided him with about 50% sustained relief for a day.   Patient presents for a follow up visit today with continued neck pain, he describes the pain as sharp, stabbing and a pulling sensation on the right side of the neck associated with spasms and tingling down to his right elbow. He rates the pain a 9 out of 10 on the pain scale today. He notes he has trialed injection therapy in the past with Dr. Saez with minimal relief, however he notes he has yet to trial cervical trigger points. Patient denies any bowel or bladder dysfunction, incontinence, or saddle anesthesia.

## 2024-04-29 NOTE — DISCUSSION/SUMMARY
[de-identified] : A discussion regarding available pain management treatment options occurred with the patient. These included interventional, rehabilitative, pharmacological, and alternative modalities. We will proceed with the following:   Interventional treatment options:  1. Ordered a C7-T1 GEOVANI without MAC.  Treatment options were discussed with the patient. The patient has been having persistent neck pain with radiculopathy with minimal improvement with conservative therapies. The patient was given the option to proceed with a cervical epidural steroid injection to try to get some pain relief.  If we are unable to get pain relief with this procedure we will reassess our options before proceeding.    The risks and benefits were discussed which included bleeding, infection, nerve injury, no pain relief or worse, increased pain. All questions were answered, and the patient will schedule for the injection on the next available date.  Medication based treatment options:  -Ordered Methocarbamol 750 MG TAKE 1 TABLET AT BEDTIME for 30 Days    Follow up 1-2 weeks post injection for assessment of efficacy and further recommendations.  I, Robert Hansen, attest that this documentation has been prepared under the direction and in the presence of Provider Tomi Campuzano, DO The documentation recorded by the scribe, in my presence, accurately reflects the service I personally performed, and the decisions made by me with my edits as appropriate.   Best Regards, Tomi Campuzano D.O.

## 2024-04-29 NOTE — PHYSICAL EXAM
[de-identified] : Cervical Spine:  + Spurling's on the right side  Lumbar spine:  +facet loading bilaterally  TTP to right cervical paraspinals and traps

## 2024-05-15 ENCOUNTER — APPOINTMENT (OUTPATIENT)
Dept: PAIN MANAGEMENT | Facility: CLINIC | Age: 59
End: 2024-05-15

## 2024-06-04 ENCOUNTER — APPOINTMENT (OUTPATIENT)
Dept: PAIN MANAGEMENT | Facility: CLINIC | Age: 59
End: 2024-06-04

## 2024-06-27 ENCOUNTER — APPOINTMENT (OUTPATIENT)
Dept: NEUROLOGY | Facility: CLINIC | Age: 59
End: 2024-06-27

## 2024-07-01 ENCOUNTER — APPOINTMENT (OUTPATIENT)
Dept: NEUROLOGY | Facility: CLINIC | Age: 59
End: 2024-07-01
Payer: COMMERCIAL

## 2024-07-01 VITALS
HEIGHT: 66 IN | BODY MASS INDEX: 27.97 KG/M2 | DIASTOLIC BLOOD PRESSURE: 78 MMHG | HEART RATE: 86 BPM | WEIGHT: 174 LBS | SYSTOLIC BLOOD PRESSURE: 114 MMHG

## 2024-07-01 DIAGNOSIS — G25.81 RESTLESS LEGS SYNDROME: ICD-10-CM

## 2024-07-01 DIAGNOSIS — G44.009 CLUSTER HEADACHE SYNDROME, UNSPECIFIED, NOT INTRACTABLE: ICD-10-CM

## 2024-07-01 PROCEDURE — 99213 OFFICE O/P EST LOW 20 MIN: CPT

## 2024-09-18 ENCOUNTER — APPOINTMENT (OUTPATIENT)
Dept: ORTHOPEDIC SURGERY | Facility: CLINIC | Age: 59
End: 2024-09-18
Payer: COMMERCIAL

## 2024-09-18 DIAGNOSIS — M65.9 SYNOVITIS AND TENOSYNOVITIS, UNSPECIFIED: ICD-10-CM

## 2024-09-18 DIAGNOSIS — M24.159 OTHER ARTICULAR CARTILAGE DISORDERS, UNSPECIFIED HIP: ICD-10-CM

## 2024-09-18 DIAGNOSIS — M70.62 TROCHANTERIC BURSITIS, LEFT HIP: ICD-10-CM

## 2024-09-18 DIAGNOSIS — F17.200 NICOTINE DEPENDENCE, UNSPECIFIED, UNCOMPLICATED: ICD-10-CM

## 2024-09-18 DIAGNOSIS — M85.89 OTHER SPECIFIED DISORDERS OF BONE DENSITY AND STRUCTURE, MULTIPLE SITES: ICD-10-CM

## 2024-09-18 PROCEDURE — 73522 X-RAY EXAM HIPS BI 3-4 VIEWS: CPT

## 2024-09-18 PROCEDURE — 99214 OFFICE O/P EST MOD 30 MIN: CPT

## 2024-09-18 PROCEDURE — 73560 X-RAY EXAM OF KNEE 1 OR 2: CPT | Mod: 50

## 2024-09-18 PROCEDURE — 99204 OFFICE O/P NEW MOD 45 MIN: CPT

## 2024-09-18 RX ORDER — CHOLECALCIFEROL (VITAMIN D3) 50 MCG
50 MCG TABLET ORAL
Qty: 60 | Refills: 1 | Status: ACTIVE | COMMUNITY
Start: 2024-09-18 | End: 1900-01-01

## 2024-09-18 RX ORDER — ACETAMINOPHEN 500 MG/1
500 TABLET ORAL 3 TIMES DAILY
Qty: 120 | Refills: 0 | Status: ACTIVE | COMMUNITY
Start: 2024-09-18 | End: 1900-01-01

## 2024-09-18 NOTE — DISCUSSION/SUMMARY
[de-identified] : HE IS RETIRED FROM [employer] [facility] AND NEEDED TO TAKE DISABILITY BECAUSE OF CERVICAL DISEASE.  HE DOES SEE PAIN MANAGEMENT HERE AND IS ON MEDICATION THROUGH THEM.  WE ARE SEEING HIM MOSTLY FOR DISCOMFORT BY HIS LEFT HIP AND TO LESSER EXTENT THE LEFT KNEE.  X-RAYS TAKEN TODAY IN OUR OFFICE 2 VIEWS OF THE PELVIS AND HIPS SHOW MILD DJD. X-RAYS TAKEN TODAY IN OUR OFFICE 2 VIEWS OF THE LEFT KNEE SHOW MILD DJD. MOST OF HIS SYMPTOMS ARE BY HIS ANTERIOR SUPERIOR ILIAC SPINE WHERE HE PROBABLY HAS SOME BURSITIS WE ARE GOING TO SEND HIM TO PAIN MANAGEMENT FOR SELECTIVE INJECTIONS UNDER ULTRASOUND OF THAT AREA VERSUS THE GREATER TROCHES VERSUS THE HIP JOINT ITSELF. HE IS ALREADY ON NSAIDS BUT WE WILL ADD TYLENOL AND CALCIUM AND VITAMIN D.  He does smoke 2 packs a day we spoke to him about that and about asking his primary care doctor who is going to see next month for Chantix or Zyban and he is open to that Left HIP EXAM  The range of motion of the hips both supine and prone are virtually full. Straight leg raising is negative. They are not tender over the greater troches bursa to palpation or to resisted abduction. Their leg lengths are equal. They are neurovascularly intact without signs or symptoms of DVT and with no cord  OVERALL PHYSICAL EXAM GENERAL: Well developed well nourished in no acute distress   MENTAL:Alert and oriented x3, normal affect, Pleasant and accompanied by family   MUSCULOSKELETAL: Ambulating independently   HEENT: NCAT, EOM intact, mucosa moist, neck supple with adequate free rom   CARDIOVASCULAR/HEMATOLOGICAL: no JVD, no peripheral edema, good capillary refill,  skin warm and well perfused, no venous stasis ulcers, pulses intact, no pallor or superficial non-traumatic bruising   NEUROLOGICAL: free passive rom without rigidity or cog wheel motion   RESPIRATORY: no gross stridor or wheezing or increased respiratory effort or use of O2, good capil refill and color   INTEGUMENTARY: skin intact without obvious suspicious lesions where examined

## 2024-10-18 ENCOUNTER — APPOINTMENT (OUTPATIENT)
Dept: PAIN MANAGEMENT | Facility: CLINIC | Age: 59
End: 2024-10-18
Payer: COMMERCIAL

## 2024-10-18 DIAGNOSIS — M70.62 TROCHANTERIC BURSITIS, LEFT HIP: ICD-10-CM

## 2024-10-18 DIAGNOSIS — M16.12 UNILATERAL PRIMARY OSTEOARTHRITIS, LEFT HIP: ICD-10-CM

## 2024-10-18 PROCEDURE — 20610 DRAIN/INJ JOINT/BURSA W/O US: CPT | Mod: LT

## 2024-10-18 PROCEDURE — 99214 OFFICE O/P EST MOD 30 MIN: CPT | Mod: 25

## 2024-10-31 ENCOUNTER — APPOINTMENT (OUTPATIENT)
Dept: PAIN MANAGEMENT | Facility: CLINIC | Age: 59
End: 2024-10-31
Payer: COMMERCIAL

## 2024-10-31 DIAGNOSIS — M16.12 UNILATERAL PRIMARY OSTEOARTHRITIS, LEFT HIP: ICD-10-CM

## 2024-10-31 PROCEDURE — 77002 NEEDLE LOCALIZATION BY XRAY: CPT | Mod: 79

## 2024-10-31 PROCEDURE — 20610 DRAIN/INJ JOINT/BURSA W/O US: CPT | Mod: LT

## 2024-11-15 ENCOUNTER — APPOINTMENT (OUTPATIENT)
Dept: PAIN MANAGEMENT | Facility: CLINIC | Age: 59
End: 2024-11-15
Payer: COMMERCIAL

## 2024-11-15 DIAGNOSIS — M25.569 PAIN IN UNSPECIFIED KNEE: ICD-10-CM

## 2024-11-15 DIAGNOSIS — M54.50 LOW BACK PAIN, UNSPECIFIED: ICD-10-CM

## 2024-11-15 PROCEDURE — 99214 OFFICE O/P EST MOD 30 MIN: CPT

## 2024-11-15 RX ORDER — METHYLPREDNISOLONE 4 MG/1
4 TABLET ORAL
Qty: 1 | Refills: 0 | Status: ACTIVE | COMMUNITY
Start: 2024-11-15 | End: 1900-01-01

## 2024-12-02 ENCOUNTER — APPOINTMENT (OUTPATIENT)
Dept: NEUROLOGY | Facility: CLINIC | Age: 59
End: 2024-12-02

## 2024-12-06 ENCOUNTER — APPOINTMENT (OUTPATIENT)
Dept: PAIN MANAGEMENT | Facility: CLINIC | Age: 59
End: 2024-12-06

## 2025-02-10 ENCOUNTER — APPOINTMENT (OUTPATIENT)
Dept: NEUROLOGY | Facility: CLINIC | Age: 60
End: 2025-02-10

## 2025-04-11 ENCOUNTER — APPOINTMENT (OUTPATIENT)
Dept: PAIN MANAGEMENT | Facility: CLINIC | Age: 60
End: 2025-04-11
Payer: COMMERCIAL

## 2025-04-11 DIAGNOSIS — M54.12 RADICULOPATHY, CERVICAL REGION: ICD-10-CM

## 2025-04-11 DIAGNOSIS — M25.569 PAIN IN UNSPECIFIED KNEE: ICD-10-CM

## 2025-04-11 PROCEDURE — 20610 DRAIN/INJ JOINT/BURSA W/O US: CPT | Mod: LT

## 2025-04-11 PROCEDURE — 99214 OFFICE O/P EST MOD 30 MIN: CPT | Mod: 25

## 2025-04-11 RX ORDER — NABUMETONE 750 MG/1
750 TABLET, FILM COATED ORAL
Qty: 60 | Refills: 2 | Status: ACTIVE | COMMUNITY
Start: 2025-04-11 | End: 1900-01-01

## 2025-04-25 ENCOUNTER — APPOINTMENT (OUTPATIENT)
Dept: PAIN MANAGEMENT | Facility: CLINIC | Age: 60
End: 2025-04-25
Payer: COMMERCIAL

## 2025-04-25 DIAGNOSIS — G89.29 PAIN IN RIGHT KNEE: ICD-10-CM

## 2025-04-25 DIAGNOSIS — M25.561 PAIN IN RIGHT KNEE: ICD-10-CM

## 2025-04-25 DIAGNOSIS — M25.562 PAIN IN RIGHT KNEE: ICD-10-CM

## 2025-04-25 PROCEDURE — 99213 OFFICE O/P EST LOW 20 MIN: CPT

## 2025-06-09 ENCOUNTER — APPOINTMENT (OUTPATIENT)
Dept: PAIN MANAGEMENT | Facility: CLINIC | Age: 60
End: 2025-06-09
Payer: COMMERCIAL

## 2025-06-09 PROCEDURE — 99214 OFFICE O/P EST MOD 30 MIN: CPT

## 2025-06-12 ENCOUNTER — APPOINTMENT (OUTPATIENT)
Dept: PAIN MANAGEMENT | Facility: CLINIC | Age: 60
End: 2025-06-12

## 2025-06-12 PROCEDURE — 64450 NJX AA&/STRD OTHER PN/BRANCH: CPT | Mod: LT

## 2025-06-12 PROCEDURE — 77002 NEEDLE LOCALIZATION BY XRAY: CPT

## 2025-06-30 ENCOUNTER — APPOINTMENT (OUTPATIENT)
Dept: PAIN MANAGEMENT | Facility: CLINIC | Age: 60
End: 2025-06-30
Payer: COMMERCIAL

## 2025-06-30 PROBLEM — M17.12 PRIMARY LOCALIZED OSTEOARTHRITIS OF LEFT KNEE: Status: ACTIVE | Noted: 2025-06-09

## 2025-06-30 PROCEDURE — 99213 OFFICE O/P EST LOW 20 MIN: CPT

## 2025-08-05 ENCOUNTER — APPOINTMENT (OUTPATIENT)
Dept: NEUROLOGY | Facility: CLINIC | Age: 60
End: 2025-08-05
Payer: COMMERCIAL

## 2025-08-05 VITALS
SYSTOLIC BLOOD PRESSURE: 132 MMHG | BODY MASS INDEX: 28.93 KG/M2 | HEIGHT: 66 IN | DIASTOLIC BLOOD PRESSURE: 83 MMHG | HEART RATE: 80 BPM | WEIGHT: 180 LBS

## 2025-08-05 DIAGNOSIS — G44.009 CLUSTER HEADACHE SYNDROME, UNSPECIFIED, NOT INTRACTABLE: ICD-10-CM

## 2025-08-05 DIAGNOSIS — G25.81 RESTLESS LEGS SYNDROME: ICD-10-CM

## 2025-08-05 PROCEDURE — 99214 OFFICE O/P EST MOD 30 MIN: CPT
